# Patient Record
Sex: MALE | Race: ASIAN | NOT HISPANIC OR LATINO | URBAN - METROPOLITAN AREA
[De-identification: names, ages, dates, MRNs, and addresses within clinical notes are randomized per-mention and may not be internally consistent; named-entity substitution may affect disease eponyms.]

---

## 2018-05-28 ENCOUNTER — INPATIENT (INPATIENT)
Facility: HOSPITAL | Age: 63
LOS: 1 days | Discharge: HOME | End: 2018-05-30
Attending: SURGERY | Admitting: SURGERY
Payer: MEDICAID

## 2018-05-28 VITALS
RESPIRATION RATE: 16 BRPM | OXYGEN SATURATION: 97 % | HEART RATE: 111 BPM | DIASTOLIC BLOOD PRESSURE: 111 MMHG | SYSTOLIC BLOOD PRESSURE: 174 MMHG | TEMPERATURE: 98 F

## 2018-05-28 DIAGNOSIS — K62.5 HEMORRHAGE OF ANUS AND RECTUM: ICD-10-CM

## 2018-05-28 LAB
ALBUMIN SERPL ELPH-MCNC: 4.2 G/DL — SIGNIFICANT CHANGE UP (ref 3.3–5)
ALP SERPL-CCNC: 55 U/L — SIGNIFICANT CHANGE UP (ref 40–120)
ALT FLD-CCNC: 29 U/L — SIGNIFICANT CHANGE UP (ref 4–41)
AST SERPL-CCNC: 24 U/L — SIGNIFICANT CHANGE UP (ref 4–40)
BASOPHILS # BLD AUTO: 0.04 K/UL — SIGNIFICANT CHANGE UP (ref 0–0.2)
BASOPHILS # BLD AUTO: 0.05 K/UL — SIGNIFICANT CHANGE UP (ref 0–0.2)
BASOPHILS NFR BLD AUTO: 0.6 % — SIGNIFICANT CHANGE UP (ref 0–2)
BASOPHILS NFR BLD AUTO: 0.6 % — SIGNIFICANT CHANGE UP (ref 0–2)
BILIRUB SERPL-MCNC: 0.5 MG/DL — SIGNIFICANT CHANGE UP (ref 0.2–1.2)
BUN SERPL-MCNC: 18 MG/DL — SIGNIFICANT CHANGE UP (ref 7–23)
CALCIUM SERPL-MCNC: 8.8 MG/DL — SIGNIFICANT CHANGE UP (ref 8.4–10.5)
CHLORIDE SERPL-SCNC: 93 MMOL/L — LOW (ref 98–107)
CO2 SERPL-SCNC: 23 MMOL/L — SIGNIFICANT CHANGE UP (ref 22–31)
CREAT SERPL-MCNC: 0.71 MG/DL — SIGNIFICANT CHANGE UP (ref 0.5–1.3)
EOSINOPHIL # BLD AUTO: 0.22 K/UL — SIGNIFICANT CHANGE UP (ref 0–0.5)
EOSINOPHIL # BLD AUTO: 0.27 K/UL — SIGNIFICANT CHANGE UP (ref 0–0.5)
EOSINOPHIL NFR BLD AUTO: 3.1 % — SIGNIFICANT CHANGE UP (ref 0–6)
EOSINOPHIL NFR BLD AUTO: 3.4 % — SIGNIFICANT CHANGE UP (ref 0–6)
GLUCOSE SERPL-MCNC: 152 MG/DL — HIGH (ref 70–99)
HBA1C BLD-MCNC: 7.7 % — HIGH (ref 4–5.6)
HCT VFR BLD CALC: 39.2 % — SIGNIFICANT CHANGE UP (ref 39–50)
HCT VFR BLD CALC: 39.8 % — SIGNIFICANT CHANGE UP (ref 39–50)
HCT VFR BLD CALC: 42.6 % — SIGNIFICANT CHANGE UP (ref 39–50)
HCT VFR BLD CALC: 43.5 % — SIGNIFICANT CHANGE UP (ref 39–50)
HGB BLD-MCNC: 13.5 G/DL — SIGNIFICANT CHANGE UP (ref 13–17)
HGB BLD-MCNC: 13.9 G/DL — SIGNIFICANT CHANGE UP (ref 13–17)
HGB BLD-MCNC: 14.6 G/DL — SIGNIFICANT CHANGE UP (ref 13–17)
HGB BLD-MCNC: 15.3 G/DL — SIGNIFICANT CHANGE UP (ref 13–17)
IMM GRANULOCYTES # BLD AUTO: 0.01 # — SIGNIFICANT CHANGE UP
IMM GRANULOCYTES # BLD AUTO: 0.03 # — SIGNIFICANT CHANGE UP
IMM GRANULOCYTES NFR BLD AUTO: 0.1 % — SIGNIFICANT CHANGE UP (ref 0–1.5)
IMM GRANULOCYTES NFR BLD AUTO: 0.4 % — SIGNIFICANT CHANGE UP (ref 0–1.5)
LYMPHOCYTES # BLD AUTO: 1.61 K/UL — SIGNIFICANT CHANGE UP (ref 1–3.3)
LYMPHOCYTES # BLD AUTO: 1.89 K/UL — SIGNIFICANT CHANGE UP (ref 1–3.3)
LYMPHOCYTES # BLD AUTO: 20.2 % — SIGNIFICANT CHANGE UP (ref 13–44)
LYMPHOCYTES # BLD AUTO: 26.5 % — SIGNIFICANT CHANGE UP (ref 13–44)
MCHC RBC-ENTMCNC: 29.1 PG — SIGNIFICANT CHANGE UP (ref 27–34)
MCHC RBC-ENTMCNC: 29.6 PG — SIGNIFICANT CHANGE UP (ref 27–34)
MCHC RBC-ENTMCNC: 29.9 PG — SIGNIFICANT CHANGE UP (ref 27–34)
MCHC RBC-ENTMCNC: 30.2 PG — SIGNIFICANT CHANGE UP (ref 27–34)
MCHC RBC-ENTMCNC: 34.3 % — SIGNIFICANT CHANGE UP (ref 32–36)
MCHC RBC-ENTMCNC: 34.4 % — SIGNIFICANT CHANGE UP (ref 32–36)
MCHC RBC-ENTMCNC: 34.9 % — SIGNIFICANT CHANGE UP (ref 32–36)
MCHC RBC-ENTMCNC: 35.2 % — SIGNIFICANT CHANGE UP (ref 32–36)
MCV RBC AUTO: 84.5 FL — SIGNIFICANT CHANGE UP (ref 80–100)
MCV RBC AUTO: 85.1 FL — SIGNIFICANT CHANGE UP (ref 80–100)
MCV RBC AUTO: 86.2 FL — SIGNIFICANT CHANGE UP (ref 80–100)
MCV RBC AUTO: 86.5 FL — SIGNIFICANT CHANGE UP (ref 80–100)
MONOCYTES # BLD AUTO: 0.52 K/UL — SIGNIFICANT CHANGE UP (ref 0–0.9)
MONOCYTES # BLD AUTO: 0.54 K/UL — SIGNIFICANT CHANGE UP (ref 0–0.9)
MONOCYTES NFR BLD AUTO: 6.5 % — SIGNIFICANT CHANGE UP (ref 2–14)
MONOCYTES NFR BLD AUTO: 7.6 % — SIGNIFICANT CHANGE UP (ref 2–14)
NEUTROPHILS # BLD AUTO: 4.44 K/UL — SIGNIFICANT CHANGE UP (ref 1.8–7.4)
NEUTROPHILS # BLD AUTO: 5.51 K/UL — SIGNIFICANT CHANGE UP (ref 1.8–7.4)
NEUTROPHILS NFR BLD AUTO: 62.1 % — SIGNIFICANT CHANGE UP (ref 43–77)
NEUTROPHILS NFR BLD AUTO: 68.9 % — SIGNIFICANT CHANGE UP (ref 43–77)
NRBC # FLD: 0 — SIGNIFICANT CHANGE UP
OB PNL STL: POSITIVE — SIGNIFICANT CHANGE UP
PLATELET # BLD AUTO: 185 K/UL — SIGNIFICANT CHANGE UP (ref 150–400)
PLATELET # BLD AUTO: 196 K/UL — SIGNIFICANT CHANGE UP (ref 150–400)
PLATELET # BLD AUTO: 198 K/UL — SIGNIFICANT CHANGE UP (ref 150–400)
PLATELET # BLD AUTO: 204 K/UL — SIGNIFICANT CHANGE UP (ref 150–400)
PMV BLD: 9.2 FL — SIGNIFICANT CHANGE UP (ref 7–13)
PMV BLD: 9.4 FL — SIGNIFICANT CHANGE UP (ref 7–13)
PMV BLD: 9.5 FL — SIGNIFICANT CHANGE UP (ref 7–13)
PMV BLD: 9.6 FL — SIGNIFICANT CHANGE UP (ref 7–13)
POTASSIUM SERPL-MCNC: 3.9 MMOL/L — SIGNIFICANT CHANGE UP (ref 3.5–5.3)
POTASSIUM SERPL-SCNC: 3.9 MMOL/L — SIGNIFICANT CHANGE UP (ref 3.5–5.3)
PROT SERPL-MCNC: 7.5 G/DL — SIGNIFICANT CHANGE UP (ref 6–8.3)
RBC # BLD: 4.6 M/UL — SIGNIFICANT CHANGE UP (ref 4.2–5.8)
RBC # BLD: 4.64 M/UL — SIGNIFICANT CHANGE UP (ref 4.2–5.8)
RBC # BLD: 4.94 M/UL — SIGNIFICANT CHANGE UP (ref 4.2–5.8)
RBC # BLD: 5.11 M/UL — SIGNIFICANT CHANGE UP (ref 4.2–5.8)
RBC # FLD: 12.2 % — SIGNIFICANT CHANGE UP (ref 10.3–14.5)
RBC # FLD: 12.2 % — SIGNIFICANT CHANGE UP (ref 10.3–14.5)
RBC # FLD: 12.3 % — SIGNIFICANT CHANGE UP (ref 10.3–14.5)
RBC # FLD: 12.3 % — SIGNIFICANT CHANGE UP (ref 10.3–14.5)
RH IG SCN BLD-IMP: POSITIVE — SIGNIFICANT CHANGE UP
SODIUM SERPL-SCNC: 132 MMOL/L — LOW (ref 135–145)
WBC # BLD: 6.71 K/UL — SIGNIFICANT CHANGE UP (ref 3.8–10.5)
WBC # BLD: 7.14 K/UL — SIGNIFICANT CHANGE UP (ref 3.8–10.5)
WBC # BLD: 7.99 K/UL — SIGNIFICANT CHANGE UP (ref 3.8–10.5)
WBC # BLD: 9.1 K/UL — SIGNIFICANT CHANGE UP (ref 3.8–10.5)
WBC # FLD AUTO: 6.71 K/UL — SIGNIFICANT CHANGE UP (ref 3.8–10.5)
WBC # FLD AUTO: 7.14 K/UL — SIGNIFICANT CHANGE UP (ref 3.8–10.5)
WBC # FLD AUTO: 7.99 K/UL — SIGNIFICANT CHANGE UP (ref 3.8–10.5)
WBC # FLD AUTO: 9.1 K/UL — SIGNIFICANT CHANGE UP (ref 3.8–10.5)

## 2018-05-28 PROCEDURE — 99233 SBSQ HOSP IP/OBS HIGH 50: CPT

## 2018-05-28 PROCEDURE — 74174 CTA ABD&PLVS W/CONTRAST: CPT | Mod: 26

## 2018-05-28 RX ORDER — SODIUM CHLORIDE 9 MG/ML
1000 INJECTION, SOLUTION INTRAVENOUS
Qty: 0 | Refills: 0 | Status: DISCONTINUED | OUTPATIENT
Start: 2018-05-28 | End: 2018-05-29

## 2018-05-28 RX ORDER — INSULIN LISPRO 100/ML
VIAL (ML) SUBCUTANEOUS EVERY 6 HOURS
Qty: 0 | Refills: 0 | Status: DISCONTINUED | OUTPATIENT
Start: 2018-05-28 | End: 2018-05-29

## 2018-05-28 RX ORDER — INSULIN GLARGINE 100 [IU]/ML
0 INJECTION, SOLUTION SUBCUTANEOUS
Qty: 0 | Refills: 0 | COMMUNITY

## 2018-05-28 RX ORDER — SODIUM CHLORIDE 9 MG/ML
1000 INJECTION INTRAMUSCULAR; INTRAVENOUS; SUBCUTANEOUS ONCE
Qty: 0 | Refills: 0 | Status: COMPLETED | OUTPATIENT
Start: 2018-05-28 | End: 2018-05-28

## 2018-05-28 RX ORDER — METFORMIN HYDROCHLORIDE 850 MG/1
1 TABLET ORAL
Qty: 0 | Refills: 0 | COMMUNITY

## 2018-05-28 RX ORDER — AMLODIPINE BESYLATE 2.5 MG/1
5 TABLET ORAL ONCE
Qty: 0 | Refills: 0 | Status: COMPLETED | OUTPATIENT
Start: 2018-05-28 | End: 2018-05-28

## 2018-05-28 RX ORDER — ATORVASTATIN CALCIUM 80 MG/1
1 TABLET, FILM COATED ORAL
Qty: 0 | Refills: 0 | COMMUNITY

## 2018-05-28 RX ORDER — LABETALOL HCL 100 MG
10 TABLET ORAL ONCE
Qty: 0 | Refills: 0 | Status: COMPLETED | OUTPATIENT
Start: 2018-05-28 | End: 2018-05-28

## 2018-05-28 RX ORDER — SOD SULF/SODIUM/NAHCO3/KCL/PEG
1000 SOLUTION, RECONSTITUTED, ORAL ORAL EVERY 4 HOURS
Qty: 0 | Refills: 0 | Status: COMPLETED | OUTPATIENT
Start: 2018-05-28 | End: 2018-05-28

## 2018-05-28 RX ADMIN — SODIUM CHLORIDE 125 MILLILITER(S): 9 INJECTION, SOLUTION INTRAVENOUS at 19:25

## 2018-05-28 RX ADMIN — Medication 1000 MILLILITER(S): at 21:52

## 2018-05-28 RX ADMIN — Medication 2: at 17:48

## 2018-05-28 RX ADMIN — Medication 10 MILLIGRAM(S): at 19:06

## 2018-05-28 RX ADMIN — SODIUM CHLORIDE 1000 MILLILITER(S): 9 INJECTION INTRAMUSCULAR; INTRAVENOUS; SUBCUTANEOUS at 05:05

## 2018-05-28 RX ADMIN — Medication 2: at 12:40

## 2018-05-28 RX ADMIN — SODIUM CHLORIDE 125 MILLILITER(S): 9 INJECTION, SOLUTION INTRAVENOUS at 09:18

## 2018-05-28 RX ADMIN — SODIUM CHLORIDE 125 MILLILITER(S): 9 INJECTION, SOLUTION INTRAVENOUS at 06:45

## 2018-05-28 RX ADMIN — Medication 10 MILLIGRAM(S): at 20:34

## 2018-05-28 RX ADMIN — SODIUM CHLORIDE 1000 MILLILITER(S): 9 INJECTION INTRAMUSCULAR; INTRAVENOUS; SUBCUTANEOUS at 03:33

## 2018-05-28 RX ADMIN — AMLODIPINE BESYLATE 5 MILLIGRAM(S): 2.5 TABLET ORAL at 06:32

## 2018-05-28 RX ADMIN — Medication 1000 MILLILITER(S): at 17:49

## 2018-05-28 NOTE — CONSULT NOTE ADULT - ASSESSMENT
62 year old male h/o GI bleed, diverticulosis with colonoscopy and clipping in 2013 who currently presents with two episodes of asymptomatic bright red blood per rectum. Patient states that bloody bowel movements began at midnight [currently 08:30] and have had only two since; stool mixed with blood and clots. Feels similar to previous episode, which was treated with the aforementioned colonoscopy and clipping as well as TXA. Has not had any further bleeding issues or colonoscopies since. Was in his usual good state of health until midnight, eating and drinking as per his norm. Patient currently denies chest pain, shortness of breath, dizziness, weakness or syncope. Patient denies h/o HTN or anti-hypertensive medications.     PLAN:     Neurologic: AAO x4, mentating well; continue to monitor  Respiratory: breathing and saturating well on room air, will maintain SPO2 >92%  Cardiovascular: H/H stable x2 at 15/45, transfuse prn  Gastrointestinal/Nutrition:   Renal/Genitourinary:   Hematologic:   Infectious Disease:   Tubes/Lines/Drains:   Endocrine:   Disposition:     --------------------------------------------------------------------------------------    Critical Care Diagnoses: 62 year old male h/o GI bleed, diverticulosis with colonoscopy and clipping in 2013 who currently presents with two episodes of asymptomatic bright red blood per rectum. Patient states that bloody bowel movements began at midnight [currently 08:30] and have had only two since; stool mixed with blood and clots. Feels similar to previous episode, which was treated with the aforementioned colonoscopy and clipping as well as TXA. Has not had any further bleeding issues or colonoscopies since. Was in his usual good state of health until midnight, eating and drinking as per his norm. Patient currently denies chest pain, shortness of breath, dizziness, weakness or syncope. Patient denies h/o HTN or anti-hypertensive medications.     PLAN:     Neurologic: AAO x4, mentating well; continue to monitor  Respiratory: Adequate oxygenation; saturating well on room air, will maintain SPO2 >92%  Cardiovascular: Adequately perfused. Hemodynamically stable. C/w IVF w/ LR @ 125 cc/hr.  Gastrointestinal/Nutrition: Clear liquid diet. GI consulted - f/u recs. CTPA to localize source of bleeding.  Renal/Genitourinary: BUN/Cr stable. Monitor UOP. Ins and Outs. Hyponatremia 2/2 to GI losses - trend BMP. C/w IVF LR @125cc/hr.  Hematologic: H/H stable x 2. Trend CBC, transfuse PRN for Hgb goal >7.0. Coags wnl. Mechanical VTE ppx in setting of active bleeding.   Infectious Disease: Afebrile. No leukocytosis. Monitor off abx for now.  Tubes/Lines/Drains: PIV  Endocrine: Blood glucose wnl. Monitor FS.  Disposition: SICU    --------------------------------------------------------------------------------------    Critical Care Diagnoses: Gastrointestinal bleeding, Hyponatremia

## 2018-05-28 NOTE — CONSULT NOTE ADULT - ATTENDING COMMENTS
I have seen and evaluated the patient with the GI Fellow and GI Team.  I agree with the findings, formulation and plan of care as documented in the  fellow's note, except as noted.
I have personally interviewed and examined this patient, reviewed pertinent labs and imaging, and discussed the case with colleagues, residents, physician assistants, and nurses on SICU rounds.     35    minutes in total were spent in providing direct critical care for the diagnoses, assessment and plan outlined below.  These diagnoses are unrelated to the surgical procedure.  Additionally, time spent in the performance of separately billable procedures was not counted toward the critical care time.  There is no overlap.    The active critical care issues are:  I10 Hypertension, unspecified type   K57.91 Gastrointestinal hemorrhage associated with intestinal diverticulosis     Plan  neuro: no active issue  cards: hypertensive urgency (systolic > 200) resolved with po amlodipine x1, continue to monitor, goal systolic < 180  pulm: no active issue  GI: NPO monitor for bleeding. CTA with active extravesation, fu GI, fu primary team  : monitor uop, Cr  heme/ID: q6hr cbc until stable x 3, no abx  endo: no active issue  proph: scds only for now

## 2018-05-28 NOTE — ED PROVIDER NOTE - OBJECTIVE STATEMENT
62yM visiting family from the [facility] hx diverticulosis (hx large bleed 5yrs ago requiring 1unit prbc) p/w brbpr x 1 tonight. Pt denies abd pain, n/v, sob, palpitations, or weakness. Denies hx hemorrhoids. States this episode feels similar to prior diverticulosis episode. He informed his GI doctor (in the [facility]) who told him to go to hospital to get TXA. 62yM hx dm, hld, diverticulosis visiting family from the [facility] hx diverticulosis (hx large bleed 5yrs ago requiring 1unit prbc) p/w brbpr x 1 tonight. Pt denies abd pain, n/v, sob, palpitations, or weakness. Denies hx hemorrhoids. States this episode feels similar to prior diverticulosis episode. He informed his GI doctor (in the [facility]) who told him to go to hospital to get TXA.

## 2018-05-28 NOTE — ED PROVIDER NOTE - MEDICAL DECISION MAKING DETAILS
brbpr in pt with hx diverticulosis->likely recurrent diverticular bleed->cbc trend, t&s, gi brbpr in pt with hx diverticulosis->likely recurrent diverticular bleed->cbc trend, t&s

## 2018-05-28 NOTE — CONSULT NOTE ADULT - SUBJECTIVE AND OBJECTIVE BOX
Chief Complaint:  Patient is a 62y old  Male who presents with a chief complaint of bright red blood per rectum (28 May 2018 06:26)      HPI: 62 year old male with HTN, T2DM, history of diverticula with hemorrhage in the past, who presents with multiple episodes of bright red blood per rectum. Patient started to have bloody bowel movements at midnight and has had 3-4 since then, with blood mixed with some loose stool, passing clots and bright red blood. States this is similar to a prior episode of BRBPR in 2013. Patient was admitted with the same symptoms in the [facility], states he underwent colonoscopy and was found to have many diverticulae, and had clips placed for a right-sided diverticular bleed. Then he was given TXA and blood and required no further intervention. He has not had a colonoscopy since, he has not had any other issues since. Denies abdominal pain, nausea, emesis, constipation, chest pain, palpitations, shortness of breath, headache, lightheadedness, visual changes, weakness, diaphoresis. Of note, patient is visiting from the [facility].      Allergies:  No Known Allergies    Home Medications:  Lantus:  (28 May 2018 07:10)  Lipitor 20 mg oral tablet: 1 tab(s) orally once a day (28 May 2018 07:10)  metFORMIN 1000 mg oral tablet: 1 tab(s) orally 2 times a day (28 May 2018 07:10)      Hospital Medications:  insulin lispro (HumaLOG) corrective regimen sliding scale   SubCutaneous every 6 hours  lactated ringers. 1000 milliLiter(s) IV Continuous <Continuous>      PMHX/PSHX:  Diabetes mellitus  HTN (hypertension)  Diverticulosis  No significant past surgical history      Family history:  Family history of breast cancer in mother (Mother) at age 101.  There is no family history of peptic ulcer disease, gastric cancer, colon polyps, colon cancer, celiac disease, biliary, hepatic, pancreatic disease.  None of the female relatives have uterine or ovarian cancer.      Social History: Former smoker (60 pack year history, quit 1995), denies EtOH use, lives in the [facility], works as an , visiting family    ROS:     General:  No wt loss, fevers, chills, night sweats, fatigue,   Eyes:  Good vision, no reported pain  ENT:  No sore throat, pain, runny nose, dysphagia  CV:  No pain, palpitations, hypo/hypertension  Resp:  No dyspnea, cough, tachypnea, wheezing  GI:  See HPI  :  No pain, bleeding, incontinence, nocturia  Muscle:  No pain, weakness  Neuro:  No weakness, tingling, memory problems  Psych:  No fatigue, insomnia, mood problems, depression  Endocrine:  No polyuria, polydipsia, cold/heat intolerance  Heme:  No petechiae, ecchymosis, easy bruisability  Skin:  No rash, edema      PHYSICAL EXAM:     GENERAL:  Appears stated age, well-groomed, well-nourished, no distress  HEENT:  NC/AT,  conjunctivae clear and pink,  no JVD  CHEST:  Full & symmetric excursion, no increased effort, breath sounds clear  HEART:  Regular rhythm, S1, S2, no murmur/rub/S3/S4, no abdominal bruit, no edema  ABDOMEN:  Soft, non-tender, non-distended, normoactive bowel sounds,  no masses ,  EXTREMITIES:  no cyanosis,clubbing or edema  SKIN:  No rash/erythema/ecchymoses/petechiae/wounds/abscess/warm/dry  NEURO:  Alert, oriented    Vital Signs:  Vital Signs Last 24 Hrs  T(C): 36.8 (28 May 2018 08:20), Max: 37.2 (28 May 2018 02:42)  T(F): 98.3 (28 May 2018 08:20), Max: 99 (28 May 2018 02:42)  HR: 93 (28 May 2018 13:00) (90 - 111)  BP: 161/129 (28 May 2018 13:00) (149/99 - 213/137)  BP(mean): 136 (28 May 2018 13:00) (107 - 136)  RR: 16 (28 May 2018 13:00) (15 - 22)  SpO2: 96% (28 May 2018 13:00) (94% - 100%)  Daily     Daily     LABS:                        13.5   6.71  )-----------( 185      ( 28 May 2018 12:35 )             39.2     05-28    132<L>  |  93<L>  |  18  ----------------------------<  152<H>  3.9   |  23  |  0.71    Ca    8.8      28 May 2018 03:00    TPro  7.5  /  Alb  4.2  /  TBili  0.5  /  DBili  x   /  AST  24  /  ALT  29  /  AlkPhos  55  05-28    LIVER FUNCTIONS - ( 28 May 2018 03:00 )  Alb: 4.2 g/dL / Pro: 7.5 g/dL / ALK PHOS: 55 u/L / ALT: 29 u/L / AST: 24 u/L / GGT: x                   Imaging:    < from: CT Angio Abdomen and Pelvis w/ IV Cont (05.28.18 @ 09:52) >  EXAM:  CT ANGIO ABD PELV (W)AW IC        PROCEDURE DATE:  May 28 2018         INTERPRETATION:  CLINICAL INFORMATION: Prior right-sided diverticular   bleed, now with prior blood per rectum. Evaluate for GI bleed.    COMPARISON: None available.    PROCEDURE:   CT of the Abdomen and Pelvis was performed with and without intravenous   contrast.  Precontrast, Arterial and Delayed phases were performed.  Intravenous contrast: 90 ml Omnipaque 350. 10 ml discarded.  Oral contrast: None.  Sagittal and coronal reformats were performed.    FINDINGS:    LOWER CHEST: 4 mm nodule in the right lower lobe.    LIVER: 4 mm low-attenuation lesion in segment 6 is probably a cyst.  BILE DUCTS: Normal caliber.  GALLBLADDER: Within normal limits.  SPLEEN: Withinnormal limits.  PANCREAS: Within normal limits.  ADRENALS: Within normal limits.  KIDNEYS/URETERS: Within normal limits.    BLADDER: Within normal limits.  REPRODUCTIVE ORGANS: The prostate is within normal limits.    BOWEL: Arterial contrast blush is noted within the descending colon   (6:241) and in the left lateral wall of the rectum with diffusion on   delayed phases. Colonic diverticulosis without acute diverticulitis. No   bowel obstruction. Appendix is normal.  PERITONEUM: No ascites.  VESSELS:  Atherosclerotic changes.  RETROPERITONEUM: No lymphadenopathy.    ABDOMINAL WALL: Small fat-containing umbilical hernia.  BONES: Degenerative changes of the spine.    IMPRESSION:     Active GI bleed in the rectum and the descending colon. No evidence of   acute diverticulitis.    Findings discussed with DR. SCHMIDT on 5/28/2018 12:35 PM with read back.               ANDREA WILLS M.D., RADIOLOGY RESIDENT  This document has been electronically signed.  JESÚS RUSSELL M.D. ATTENDING RADIOLOGIST  This document has been electronically signed. May 28 2018 12:35PM        < end of copied text >

## 2018-05-28 NOTE — ED PROVIDER NOTE - ATTENDING CONTRIBUTION TO CARE
DR. SAUCEDA, ATTENDING MD-  I performed a face to face bedside interview with patient regarding history of present illness, review of symptoms and past medical history. I completed an independent physical exam.  I have discussed patient's plan of care with the resident.   Documentation as above in the note.    63 y/o male h/o htn and diverticulosis with c/o brbpr x1 this evening.  States he has had h/o lower gib in past.  Last colonoscopy 4 years ago.  Denies f/c, ha, dizziness, syncope, neck stiffness, cp, sob, cough, abd pain, n/v/d, dysuria, rash.  Afebrile, vs wnl, nad, no conjunctival pallor, dry mm, ctabil, s1s2 rrr no m/r/g, abd soft non ttp no r/g, no cva tenderness b/l, no leg swelling b/l, no rash.  One episode of brbpr, likely lower gib, no signs or sx of anemia.  Will obtain cbc, bmp, t&s, coags, stool guaiac, give ivf bolus, reassess.

## 2018-05-28 NOTE — ED PROVIDER NOTE - PROGRESS NOTE DETAILS
guaiac obtained. no hemorrhoids, nontender rectum, no gross blood, only mucus  chaperone: JAMES Colon MD CHO:  Engaged in shared decision making, pt agrees to stay for rpt CBC q4 hrs x2.  Will obs for rpt h/h. MD SAUCEDA:  Pt has had bloody bm x4 in last hour.  Denies lightheadedness, syncope.  Will need admission for further care. MD CHO:  Engaged in shared decision making, pt agrees to stay for rpt CBC q4 hrs x2.  Will likely obs for rpt h/h. Surgery consulted. They will not admit pt but will follow

## 2018-05-28 NOTE — ED ADULT NURSE NOTE - CHIEF COMPLAINT QUOTE
Pt arrives to ED c/o abd pain and blood in his stool since this evening.  Pt reports blood is bright red.  Pt hypertensive in triage and reports he does take HTN medication.  Pt denies N/V.  Pt has hx of diverticulosis.  Pt denies SOB or weakness at this.  Pt reports hx of one time transfusion.  Pt daughter is [employer] employee and here with pt.

## 2018-05-28 NOTE — CONSULT NOTE ADULT - ASSESSMENT
Impression:  63yo M with history of HTN, T2DM, previous diverticular bleeds, who presents to the ED while on vacation in [place] with painless hematochezia.    Problem List:  1) Painless hematochezia - most likely 2/2 diverticular hemorrhage, also consider 2/2 bleeding mass, angioectasia, less likely rapid transit upper GI bleed given hemodynamic stability.  Less likely infectious (patient reports eating sashimi last night, could have dysentery) given amount of blood loss. Pt not anemic but notice downtrend of Hgb from 15 -->13  2) T2DM  3) HTN    Plan:  - will plan for colonoscopy tomorrow for diagnostic and therapeutic measures  - Clear liquid diet today, NPO after midnight  - will order bowel prep to start this evening   - trend CBC q 12 hours, transfuse if Hgb <7  - can give IV PPI BID in case source is upper (low likelihood however)  - check GI PCR for infectious source (ie EHEC, entamoeba)

## 2018-05-28 NOTE — H&P ADULT - HISTORY OF PRESENT ILLNESS
HPI: 62 year old male presenting with multiple episodes of bright red blood per rectum.    PMHx: HTN, DM II, diverticulosis, GI bleed (R sided diverticular bleed, 2013)    PSHx: No significant past surgical history    Medications (home): lantus, metformin, lipitor  Allergies: No Known Allergies  (Intolerances: none)  Social Hx: Former smoker (60 pack year history, quit ), denies EtOH use, lives in the [facility], works as an , visiting family  Family Hx: Mother  age 101, breast cancer    Physical exam significant for nontender, nondistended abdomen    Imaging and labs remarkable for hematocrit of 15.3 HPI: 62 year old male presenting with multiple episodes of bright red blood per rectum. Patient started to have bloody bowel movements at midnight (7 hours ago) and has had 3-4 since then, with blood mixed with some loose stool, passing clots and bright red blood. States this is similar to a prior episode of BRBPR in 2013. Patient was admitted with the same symptoms in the [facility], states he underwent colonoscopy and was found to have many diverticulae, and had clips placed for a right-sided diverticular bleed. Then he was given TXA and blood and required no further intervention. He has not had a colonoscopy since, he has not had any other issues since. Denies abdominal pain, nausea, emesis, constipation, chest pain, palpitations, shortness of breath, headache, lightheadedness, visual changes, weakness, diaphoresis. Of note, patient is visiting from the [facility].    PMHx: HTN, DM II, diverticulosis, GI bleed (R sided diverticular bleed, 2013)    PSHx: No significant past surgical history    Medications (home): lantus, metformin, lipitor (patient is unsure of doses, denies antihypertensive medications)  Allergies: No Known Allergies  (Intolerances: none)  Social Hx: Former smoker (60 pack year history, quit ), denies EtOH use, lives in the [facility], works as an , visiting family  Family Hx: Mother  age 101, breast cancer    Physical exam significant for nontender, nondistended abdomen    Imaging and labs remarkable for hematocrit of 15.3

## 2018-05-28 NOTE — H&P ADULT - ASSESSMENT
64 year old male with bright red blood per rectum, likely associated with repeat diverticular bleed; requiring no transfusion, mildly tachycardic () and hypertensive.    - admit to SICU for serial Hg/Hct and blood pressure control  - no chemical VTE ppx in setting of active bleed  - request CT angio to attempt to localize bleed  - NPO/IVF at this time    discussed with Dr. Harris and Dr. Brito (PGY-5)  --DEIDRA Rubio, [id number]

## 2018-05-28 NOTE — CONSULT NOTE ADULT - SUBJECTIVE AND OBJECTIVE BOX
SICU Consultation Note  =====================================================  HISTORY  HPI: The patient is a 62 year old male h/o GI bleed, diverticulosis with subsequent colonoscopy and clipping in 2013 who currently presents with two episodes of asymptomatic bright red blood per rectum. Stable H/H 15/45 x2. Patient states that bloody bowel movements began at midnight [currently 08:30] and have had only two since; stool mixed with blood and clots. Feels similar to previous episode, which was treated with the aforementioned colonoscopy and clipping as well as TXA. Has not had any further bleeding issues or colonoscopies since. Was in his usual good state of health until midnight, eating and drinking as per his norm. Patient currently denies chest pain, shortness of breath, dizziness, weakness or syncope. Patient denies h/o HTN or anti-hypertensive medications.     Patient is on vacation from the [facility], and has a scheduled flight home this Wednesday, which he would like to make.      Medical and Surgical History:   - no surgical history  - HTN  - DM II  - Diverticulosis  - GI bleed (R sided diverticular bleed, 2013)     Medications:  - Lantus (24-25 units)  - Metformin  - Lipitor     Allergies: NKDA     Social Hx: Former smoker (60 pack year history, quit 1995), denies EtOH use, lives in the [facility], works as an , visiting family        ADVANCE DIRECTIVES:  Full Code       SUBJECTIVE/ROS:  [x] A ten-point review of systems was otherwise negative except as noted.  [ ] Due to altered mental status/intubation, subjective information were not able to be obtained from the patient. History was obtained, to the extent possible, from review of the chart and collateral sources of information.      VITAL SIGNS, INS/OUTS (last 24 hours):  --------------------------------------------------------------------------------------  ICU Vital Signs Last 24 Hrs  T(C): 36.8 (28 May 2018 08:20), Max: 37.2 (28 May 2018 02:42)  T(F): 98.3 (28 May 2018 08:20), Max: 99 (28 May 2018 02:42)  HR: 100 (28 May 2018 08:30) (98 - 111)  BP: 166/96 (28 May 2018 08:30) (166/96 - 213/137)  BP(mean): 114 (28 May 2018 08:30) (114 - 122)  ABP: --  ABP(mean): --  RR: 19 (28 May 2018 08:30) (16 - 20)  SpO2: 94% (28 May 2018 08:30) (94% - 100%)  --------------------------------------------------------------------------------------    EXAM  NEUROLOGY  Exam: AAO x4, no focal deficits    HEENT  Exam: Normocephalic, atraumatic    RESPIRATORY  Exam: non-labored breathing on room air    CARDIOVASCULAR  Exam: Regular rhythm, rate in the 90s    GI/NUTRITION  Exam: softly distended, non-tender    VASCULAR  Exam: Extremities warm, pink, well-perfused.      MUSCULOSKELETAL  Exam: All extremities moving spontaneously without limitations.       SKIN:  Exam: Good skin turgor, no skin breakdown.       HEMATOLOGIC  [x] DVT Prophylaxis:   Transfusions:	[] PRBC	[] Platelets		[] FFP	[] Cryoprecipitate      Tubes/Lines/Drains   [x] Peripheral IV  [] Central Venous Line     	[] R	[] L	[] IJ	[] Fem	[] SC	Date Placed:   [] Arterial Line		[] R	[] L	[] Fem	[] Rad	[] Ax	Date Placed:   [] PICC:         	[] Midline		[] Mediport  [] Urinary Catheter		Date Placed:     LABS  --------------------------------------------------------------------------------------     I&O's Detail    28 May 2018 07:01  -  28 May 2018 08:49  --------------------------------------------------------  IN:    lactated ringers.: 125 mL  Total IN: 125 mL    OUT:  Total OUT: 0 mL    Total NET: 125 mL                           14.6   7.14  )-----------( 204      ( 28 May 2018 06:30 )             42.6       05-28    132<L>  |  93<L>  |  18  ----------------------------<  152<H>  3.9   |  23  |  0.71    Ca    8.8      28 May 2018 03:00    TPro  7.5  /  Alb  4.2  /  TBili  0.5  /  DBili  x   /  AST  24  /  ALT  29  /  AlkPhos  55  05-28

## 2018-05-28 NOTE — ED ADULT TRIAGE NOTE - CHIEF COMPLAINT QUOTE
Pt arrives to ED c/o abd pain and blood in his stool since this evening.  Pt reports blood is bright red.  Pt hypertensive in triage and reports he does take HTN medication.  Pt denies N/V.  Pt has hx of diverticulosis.  Pt denies SOB or weakness at this.  Pt reports hx of one time transfusion.  Pt daughter is [employer] employee and here with pt.

## 2018-05-28 NOTE — H&P ADULT - NSHPLABSRESULTS_GEN_ALL_CORE
Complete Blood Count + Automated Diff (05.28.18 @ 03:00)    WBC Count: 7.99 K/uL    Hemoglobin: 15.3 g/dL    Hematocrit: 43.5 %    Platelet Count - Automated: 198 K/uL       Complete Blood Count + Automated Diff (05.28.18 @ 06:30)    WBC Count: 7.14 K/uL    Hemoglobin: 14.6 g/dL    Hematocrit: 42.6 %    Platelet Count - Automated: 204 K/uL                      14.6   7.14  )-----------( 204      ( 28 May 2018 06:30 )             42.6   05-28    132<L>  |  93<L>  |  18  ----------------------------<  152<H>  3.9   |  23  |  0.71    Ca    8.8      28 May 2018 03:00    TPro  7.5  /  Alb  4.2  /  TBili  0.5  /  DBili  x   /  AST  24  /  ALT  29  /  AlkPhos  55  05-28

## 2018-05-28 NOTE — ED ADULT NURSE NOTE - OBJECTIVE STATEMENT
62Y M aaOx3 c/o abdominal pain. pt has a hx of diverticuloses and is experiencing the same pain/symptoms. pt also states that has last BM a few hours ago had bright red blood present in his stool. pt denies light headiness/dizziness/ faintness. pt appears comfortable and also admits to having 2 beers tonight. pt states his abdomen is distended and feels bloated. pt abdomen is firm and non tender during assessment. bowel sounds present in all4 quadrants. respirations are even and unlabored. no ectopy noted when listening to Hr.

## 2018-05-28 NOTE — ED ADULT NURSE REASSESSMENT NOTE - NS ED NURSE REASSESS COMMENT FT1
PT previously accepted to CDU for observation; while in CDU had 4 bowel movements with BRB in 1 hour. PT brought back to main ED room 24; admitted to medicine and awaiting bed assignment. VS as noted. PT denies any pain, SOB, weakness, fatigue. Will continue to monitor closely.

## 2018-05-28 NOTE — ED ADULT NURSE NOTE - ED STAT RN HANDOFF DETAILS
Patient is A&Ox4, aware of plan of care, in NAD, and has room available.  Report given to JAMES Crane.  Patient awaiting transportation.  Will continue to monitor patient closely. ANNA Fitzgerald R.N. Patient is A&Ox4, aware of plan of care, and has room available.  Report given to JAMES Hoyos.  Patient awaiting transportation.  Will continue to monitor patient closely. ANNA Fitzgerald R.N.

## 2018-05-29 LAB
APTT BLD: 33.9 SEC — SIGNIFICANT CHANGE UP (ref 27.5–37.4)
BUN SERPL-MCNC: 11 MG/DL — SIGNIFICANT CHANGE UP (ref 7–23)
CA-I BLD-SCNC: 1.06 MMOL/L — SIGNIFICANT CHANGE UP (ref 1.03–1.23)
CALCIUM SERPL-MCNC: 7.7 MG/DL — LOW (ref 8.4–10.5)
CHLORIDE SERPL-SCNC: 102 MMOL/L — SIGNIFICANT CHANGE UP (ref 98–107)
CO2 SERPL-SCNC: 25 MMOL/L — SIGNIFICANT CHANGE UP (ref 22–31)
CREAT SERPL-MCNC: 0.82 MG/DL — SIGNIFICANT CHANGE UP (ref 0.5–1.3)
GLUCOSE SERPL-MCNC: 134 MG/DL — HIGH (ref 70–99)
HCT VFR BLD CALC: 31 % — LOW (ref 39–50)
HCT VFR BLD CALC: 31.8 % — LOW (ref 39–50)
HCT VFR BLD CALC: 32.4 % — LOW (ref 39–50)
HCT VFR BLD CALC: 32.9 % — LOW (ref 39–50)
HGB BLD-MCNC: 10.9 G/DL — LOW (ref 13–17)
HGB BLD-MCNC: 11.1 G/DL — LOW (ref 13–17)
HGB BLD-MCNC: 11.2 G/DL — LOW (ref 13–17)
HGB BLD-MCNC: 11.3 G/DL — LOW (ref 13–17)
INR BLD: 1.09 — SIGNIFICANT CHANGE UP (ref 0.88–1.17)
MAGNESIUM SERPL-MCNC: 1.8 MG/DL — SIGNIFICANT CHANGE UP (ref 1.6–2.6)
MCHC RBC-ENTMCNC: 29.2 PG — SIGNIFICANT CHANGE UP (ref 27–34)
MCHC RBC-ENTMCNC: 29.9 PG — SIGNIFICANT CHANGE UP (ref 27–34)
MCHC RBC-ENTMCNC: 29.9 PG — SIGNIFICANT CHANGE UP (ref 27–34)
MCHC RBC-ENTMCNC: 30 PG — SIGNIFICANT CHANGE UP (ref 27–34)
MCHC RBC-ENTMCNC: 34.3 % — SIGNIFICANT CHANGE UP (ref 32–36)
MCHC RBC-ENTMCNC: 34.3 % — SIGNIFICANT CHANGE UP (ref 32–36)
MCHC RBC-ENTMCNC: 35.2 % — SIGNIFICANT CHANGE UP (ref 32–36)
MCHC RBC-ENTMCNC: 35.2 % — SIGNIFICANT CHANGE UP (ref 32–36)
MCV RBC AUTO: 83.1 FL — SIGNIFICANT CHANGE UP (ref 80–100)
MCV RBC AUTO: 84.8 FL — SIGNIFICANT CHANGE UP (ref 80–100)
MCV RBC AUTO: 87 FL — SIGNIFICANT CHANGE UP (ref 80–100)
MCV RBC AUTO: 87.6 FL — SIGNIFICANT CHANGE UP (ref 80–100)
NRBC # FLD: 0 — SIGNIFICANT CHANGE UP
PHOSPHATE SERPL-MCNC: 3.2 MG/DL — SIGNIFICANT CHANGE UP (ref 2.5–4.5)
PLATELET # BLD AUTO: 158 K/UL — SIGNIFICANT CHANGE UP (ref 150–400)
PLATELET # BLD AUTO: 162 K/UL — SIGNIFICANT CHANGE UP (ref 150–400)
PLATELET # BLD AUTO: 167 K/UL — SIGNIFICANT CHANGE UP (ref 150–400)
PLATELET # BLD AUTO: 182 K/UL — SIGNIFICANT CHANGE UP (ref 150–400)
PMV BLD: 9.1 FL — SIGNIFICANT CHANGE UP (ref 7–13)
PMV BLD: 9.1 FL — SIGNIFICANT CHANGE UP (ref 7–13)
PMV BLD: 9.4 FL — SIGNIFICANT CHANGE UP (ref 7–13)
PMV BLD: 9.6 FL — SIGNIFICANT CHANGE UP (ref 7–13)
POTASSIUM SERPL-MCNC: 3.4 MMOL/L — LOW (ref 3.5–5.3)
POTASSIUM SERPL-SCNC: 3.4 MMOL/L — LOW (ref 3.5–5.3)
PROTHROM AB SERPL-ACNC: 12.1 SEC — SIGNIFICANT CHANGE UP (ref 9.8–13.1)
RBC # BLD: 3.7 M/UL — LOW (ref 4.2–5.8)
RBC # BLD: 3.73 M/UL — LOW (ref 4.2–5.8)
RBC # BLD: 3.75 M/UL — LOW (ref 4.2–5.8)
RBC # BLD: 3.78 M/UL — LOW (ref 4.2–5.8)
RBC # FLD: 12.4 % — SIGNIFICANT CHANGE UP (ref 10.3–14.5)
RBC # FLD: 12.6 % — SIGNIFICANT CHANGE UP (ref 10.3–14.5)
RBC # FLD: 12.6 % — SIGNIFICANT CHANGE UP (ref 10.3–14.5)
RBC # FLD: 12.7 % — SIGNIFICANT CHANGE UP (ref 10.3–14.5)
SODIUM SERPL-SCNC: 138 MMOL/L — SIGNIFICANT CHANGE UP (ref 135–145)
WBC # BLD: 6.18 K/UL — SIGNIFICANT CHANGE UP (ref 3.8–10.5)
WBC # BLD: 6.61 K/UL — SIGNIFICANT CHANGE UP (ref 3.8–10.5)
WBC # BLD: 6.89 K/UL — SIGNIFICANT CHANGE UP (ref 3.8–10.5)
WBC # BLD: 7.65 K/UL — SIGNIFICANT CHANGE UP (ref 3.8–10.5)
WBC # FLD AUTO: 6.18 K/UL — SIGNIFICANT CHANGE UP (ref 3.8–10.5)
WBC # FLD AUTO: 6.61 K/UL — SIGNIFICANT CHANGE UP (ref 3.8–10.5)
WBC # FLD AUTO: 6.89 K/UL — SIGNIFICANT CHANGE UP (ref 3.8–10.5)
WBC # FLD AUTO: 7.65 K/UL — SIGNIFICANT CHANGE UP (ref 3.8–10.5)

## 2018-05-29 PROCEDURE — 43239 EGD BIOPSY SINGLE/MULTIPLE: CPT | Mod: GC

## 2018-05-29 PROCEDURE — 99233 SBSQ HOSP IP/OBS HIGH 50: CPT

## 2018-05-29 RX ORDER — SODIUM CHLORIDE 9 MG/ML
1000 INJECTION, SOLUTION INTRAVENOUS
Qty: 0 | Refills: 0 | Status: DISCONTINUED | OUTPATIENT
Start: 2018-05-29 | End: 2018-05-30

## 2018-05-29 RX ORDER — HYDRALAZINE HCL 50 MG
10 TABLET ORAL EVERY 6 HOURS
Qty: 0 | Refills: 0 | Status: DISCONTINUED | OUTPATIENT
Start: 2018-05-29 | End: 2018-05-30

## 2018-05-29 RX ORDER — DEXTROSE MONOHYDRATE, SODIUM CHLORIDE, AND POTASSIUM CHLORIDE 50; .745; 4.5 G/1000ML; G/1000ML; G/1000ML
1000 INJECTION, SOLUTION INTRAVENOUS
Qty: 0 | Refills: 0 | Status: DISCONTINUED | OUTPATIENT
Start: 2018-05-29 | End: 2018-05-30

## 2018-05-29 RX ORDER — CHLORHEXIDINE GLUCONATE 213 G/1000ML
1 SOLUTION TOPICAL
Qty: 0 | Refills: 0 | Status: DISCONTINUED | OUTPATIENT
Start: 2018-05-29 | End: 2018-05-30

## 2018-05-29 RX ORDER — DEXTROSE 50 % IN WATER 50 %
15 SYRINGE (ML) INTRAVENOUS ONCE
Qty: 0 | Refills: 0 | Status: DISCONTINUED | OUTPATIENT
Start: 2018-05-29 | End: 2018-05-30

## 2018-05-29 RX ORDER — MAGNESIUM SULFATE 500 MG/ML
1 VIAL (ML) INJECTION ONCE
Qty: 0 | Refills: 0 | Status: COMPLETED | OUTPATIENT
Start: 2018-05-29 | End: 2018-05-29

## 2018-05-29 RX ORDER — DEXTROSE 50 % IN WATER 50 %
12.5 SYRINGE (ML) INTRAVENOUS ONCE
Qty: 0 | Refills: 0 | Status: DISCONTINUED | OUTPATIENT
Start: 2018-05-29 | End: 2018-05-30

## 2018-05-29 RX ORDER — PSYLLIUM SEED (WITH DEXTROSE)
1 POWDER (GRAM) ORAL DAILY
Qty: 0 | Refills: 0 | Status: DISCONTINUED | OUTPATIENT
Start: 2018-05-29 | End: 2018-05-30

## 2018-05-29 RX ORDER — DEXTROSE 50 % IN WATER 50 %
25 SYRINGE (ML) INTRAVENOUS ONCE
Qty: 0 | Refills: 0 | Status: DISCONTINUED | OUTPATIENT
Start: 2018-05-29 | End: 2018-05-30

## 2018-05-29 RX ORDER — INSULIN LISPRO 100/ML
VIAL (ML) SUBCUTANEOUS
Qty: 0 | Refills: 0 | Status: DISCONTINUED | OUTPATIENT
Start: 2018-05-29 | End: 2018-05-30

## 2018-05-29 RX ORDER — HYDRALAZINE HCL 50 MG
10 TABLET ORAL ONCE
Qty: 0 | Refills: 0 | Status: COMPLETED | OUTPATIENT
Start: 2018-05-29 | End: 2018-05-29

## 2018-05-29 RX ORDER — POTASSIUM CHLORIDE 20 MEQ
10 PACKET (EA) ORAL
Qty: 0 | Refills: 0 | Status: COMPLETED | OUTPATIENT
Start: 2018-05-29 | End: 2018-05-29

## 2018-05-29 RX ORDER — LANOLIN ALCOHOL/MO/W.PET/CERES
3 CREAM (GRAM) TOPICAL ONCE
Qty: 0 | Refills: 0 | Status: COMPLETED | OUTPATIENT
Start: 2018-05-29 | End: 2018-05-29

## 2018-05-29 RX ORDER — SODIUM CHLORIDE 9 MG/ML
1000 INJECTION, SOLUTION INTRAVENOUS
Qty: 0 | Refills: 0 | Status: DISCONTINUED | OUTPATIENT
Start: 2018-05-29 | End: 2018-05-29

## 2018-05-29 RX ORDER — LABETALOL HCL 100 MG
100 TABLET ORAL THREE TIMES A DAY
Qty: 0 | Refills: 0 | Status: DISCONTINUED | OUTPATIENT
Start: 2018-05-29 | End: 2018-05-30

## 2018-05-29 RX ORDER — GLUCAGON INJECTION, SOLUTION 0.5 MG/.1ML
1 INJECTION, SOLUTION SUBCUTANEOUS ONCE
Qty: 0 | Refills: 0 | Status: DISCONTINUED | OUTPATIENT
Start: 2018-05-29 | End: 2018-05-30

## 2018-05-29 RX ADMIN — Medication 2: at 13:24

## 2018-05-29 RX ADMIN — Medication 100 MILLIGRAM(S): at 18:11

## 2018-05-29 RX ADMIN — Medication 10 MILLIGRAM(S): at 22:57

## 2018-05-29 RX ADMIN — DEXTROSE MONOHYDRATE, SODIUM CHLORIDE, AND POTASSIUM CHLORIDE 75 MILLILITER(S): 50; .745; 4.5 INJECTION, SOLUTION INTRAVENOUS at 18:10

## 2018-05-29 RX ADMIN — Medication 100 MILLIEQUIVALENT(S): at 08:20

## 2018-05-29 RX ADMIN — Medication 2: at 05:57

## 2018-05-29 RX ADMIN — Medication 2: at 18:11

## 2018-05-29 RX ADMIN — Medication 3 MILLIGRAM(S): at 22:57

## 2018-05-29 RX ADMIN — Medication 100 GRAM(S): at 05:22

## 2018-05-29 RX ADMIN — Medication 2: at 22:37

## 2018-05-29 RX ADMIN — CHLORHEXIDINE GLUCONATE 1 APPLICATION(S): 213 SOLUTION TOPICAL at 14:12

## 2018-05-29 RX ADMIN — DEXTROSE MONOHYDRATE, SODIUM CHLORIDE, AND POTASSIUM CHLORIDE 75 MILLILITER(S): 50; .745; 4.5 INJECTION, SOLUTION INTRAVENOUS at 21:25

## 2018-05-29 RX ADMIN — Medication 10 MILLIGRAM(S): at 21:26

## 2018-05-29 RX ADMIN — Medication 100 MILLIEQUIVALENT(S): at 05:16

## 2018-05-29 RX ADMIN — Medication 100 MILLIEQUIVALENT(S): at 06:18

## 2018-05-29 NOTE — PROVIDER CONTACT NOTE (OTHER) - ACTION/TREATMENT ORDERED:
MD aware, OK as per MD.  Will place order for antihypertensive medication. Will continue to monitor.
Hydralazine po will be given as ordered.

## 2018-05-29 NOTE — CHART NOTE - NSCHARTNOTEFT_GEN_A_CORE
Patient received from SICU. Doing well. Last Hct stable at 11.2 from 11.1 . Underwent colonoscopy today showed now evidence of active bleeding. Tolerating clears. Voiding and ambulating without issue. Blood pressure continues to be elevated, but patient denies HA, lightheadedness, SOB or CP.     Vital Signs Last 24 Hrs  T(C): 36.6 (29 May 2018 17:25), Max: 36.6 (29 May 2018 17:25)  T(F): 97.8 (29 May 2018 17:25), Max: 97.8 (29 May 2018 17:25)  HR: 90 (29 May 2018 17:25) (75 - 94)  BP: 157/116 (29 May 2018 17:25) (118/83 - 161/111)  BP(mean): 112 (29 May 2018 16:00) (90 - 123)  RR: 18 (29 May 2018 17:25) (15 - 20)  SpO2: 98% (29 May 2018 17:25) (93% - 100%)    I&O's Detail    28 May 2018 07:01  -  29 May 2018 07:00  --------------------------------------------------------  IN:    IV PiggyBack: 150 mL    lactated ringers.: 2750 mL    Oral Fluid: 1300 mL  Total IN: 4200 mL    OUT:    Stool: 2150 mL    Voided: 2000 mL  Total OUT: 4150 mL    Total NET: 50 mL      29 May 2018 07:01  -  29 May 2018 19:06  --------------------------------------------------------  IN:    dextrose 5% + sodium chloride 0.45% with potassium chloride 20 mEq/L: 150 mL    lactated ringers.: 250 mL    Oral Fluid: 250 mL  Total IN: 650 mL    OUT:    Voided: 1100 mL  Total OUT: 1100 mL    Total NET: -450 mL    GEN: A&Ox3, NAD  RESP: no increased work of breathing, no use of accessory muscles  GI/ABD: soft, nontender, nondistended. no rebound or guarding.   EXTREMITIES: warm, pink, well-perfused    Labs:                        11.2   6.18  )-----------( 182      ( 29 May 2018 14:47 )             31.8   Imaging:  < from: Colonoscopy (05.29.18 @ 10:29) >    Impression:          - Moderate diverticulosis in the entire examined colon.                        There was evidence of old blood throughout the left                        colon. No active bleeding was identified.                       - Old blood in the recto-sigmoid, sigmoid, descending                        and distal transverse colon.                       - The examination was otherwise normal. No masses or                        angioectasia were identified on exam.                       - Exam was inadequate to screen for polyps < 1 cm given                        presence of old blood throughout the colon.  Recommendation:      - Return patient to [facility] for ongoing care.                       - Check hemoglobin q 12 hours until stable.                       - Use sugar-free Metamucil one tablespoon PO daily.    A/P:    64 year old male with bright red blood per rectum, likely associated with repeat diverticular bleed; requiring no transfusion, mildly tachycardic () and hypertensive. Hypertension improved, most recently 130s/ 90s, with multiple bloody BMs yesterday, waiting colonoscopy today. Now downgraded from SICU, colonoscopy revealed only diverticulosis no active bleeding, last three Hct stable (11.3-->11.1 --> 11.2) , recovering without issue.    - CLD  - Trend Hct, next CBC tonight at 9 pm   - GI recs appreciated  - BP control, started labetolol 100 TID and hydralazine 10 PRN SBP>180  - Strict I's and O's

## 2018-05-29 NOTE — PROGRESS NOTE ADULT - ASSESSMENT
64 year old male with bright red blood per rectum, likely associated with repeat diverticular bleed; requiring no transfusion, mildly tachycardic () and hypertensive. Hypertension improved, most recently 130s/ 90s, with multiple bloody BMs yesterday, waiting colonoscopy today.     - NPO/IVF for colonoscopy  - Trend Hct  - GI recs appreciated  - BP control  - Strict I's and O's

## 2018-05-29 NOTE — PROGRESS NOTE ADULT - ATTENDING COMMENTS
Attending Attestation:   I have personally interviewed and examined this patient, reviewed pertinent labs and imaging, and discussed the case with colleagues, residents, physician assistants, and nurses on SICU rounds.     35    minutes in total were spent in providing direct critical care for the diagnoses, assessment and plan outlined below.  These diagnoses are unrelated to the surgical procedure.  Additionally, time spent in the performance of separately billable procedures was not counted toward the critical care time.  There is no overlap.    The active critical care issues are:  I10 Hypertension, unspecified type   K57.91 Gastrointestinal hemorrhage associated with intestinal diverticulosis     Plan  neuro: no active issue  cards: no active issue  pulm: no active issue  GI: NPO monitor for bleeding. CTA with left-sided active extravesation, colonoscopy today   : monitor uop, Cr  heme/ID: q6hr cbc until stable x 3, no abx  endo: no active issue  proph: scds only for now . Attending Attestation:   I have personally interviewed and examined this patient, reviewed pertinent labs and imaging, and discussed the case with colleagues, residents, physician assistants, and nurses on SICU rounds.     35    minutes in total were spent in providing direct critical care for the diagnoses, assessment and plan outlined below.  These diagnoses are unrelated to the surgical procedure.  Additionally, time spent in the performance of separately billable procedures was not counted toward the critical care time.  There is no overlap.    The active critical care issues are:  I10 Hypertension, unspecified type   K57.91 Gastrointestinal hemorrhage associated with intestinal diverticulosis     Plan  neuro: no active issue  cards: no active issue  pulm: no active issue  GI: NPO monitor for bleeding. CTA with left-sided active extravesation, colonoscopy today   : monitor uop, Cr  heme/ID: q6hr cbc until stable x 3, no abx  endo: on sliding scale insulin, monitor fsg  proph: scds only for now .

## 2018-05-30 VITALS — HEART RATE: 80 BPM | DIASTOLIC BLOOD PRESSURE: 97 MMHG | SYSTOLIC BLOOD PRESSURE: 135 MMHG

## 2018-05-30 LAB
BUN SERPL-MCNC: 5 MG/DL — LOW (ref 7–23)
CA-I BLD-SCNC: 1.06 MMOL/L — SIGNIFICANT CHANGE UP (ref 1.03–1.23)
CALCIUM SERPL-MCNC: 7.6 MG/DL — LOW (ref 8.4–10.5)
CHLORIDE SERPL-SCNC: 100 MMOL/L — SIGNIFICANT CHANGE UP (ref 98–107)
CO2 SERPL-SCNC: 25 MMOL/L — SIGNIFICANT CHANGE UP (ref 22–31)
CREAT SERPL-MCNC: 0.77 MG/DL — SIGNIFICANT CHANGE UP (ref 0.5–1.3)
GLUCOSE SERPL-MCNC: 185 MG/DL — HIGH (ref 70–99)
HCT VFR BLD CALC: 31.5 % — LOW (ref 39–50)
HGB BLD-MCNC: 10.6 G/DL — LOW (ref 13–17)
MAGNESIUM SERPL-MCNC: 1.9 MG/DL — SIGNIFICANT CHANGE UP (ref 1.6–2.6)
MCHC RBC-ENTMCNC: 29.4 PG — SIGNIFICANT CHANGE UP (ref 27–34)
MCHC RBC-ENTMCNC: 33.7 % — SIGNIFICANT CHANGE UP (ref 32–36)
MCV RBC AUTO: 87.5 FL — SIGNIFICANT CHANGE UP (ref 80–100)
NRBC # FLD: 0 — SIGNIFICANT CHANGE UP
PHOSPHATE SERPL-MCNC: 2.6 MG/DL — SIGNIFICANT CHANGE UP (ref 2.5–4.5)
PLATELET # BLD AUTO: 179 K/UL — SIGNIFICANT CHANGE UP (ref 150–400)
PMV BLD: 9.6 FL — SIGNIFICANT CHANGE UP (ref 7–13)
POTASSIUM SERPL-MCNC: 3.6 MMOL/L — SIGNIFICANT CHANGE UP (ref 3.5–5.3)
POTASSIUM SERPL-SCNC: 3.6 MMOL/L — SIGNIFICANT CHANGE UP (ref 3.5–5.3)
RBC # BLD: 3.6 M/UL — LOW (ref 4.2–5.8)
RBC # FLD: 12.6 % — SIGNIFICANT CHANGE UP (ref 10.3–14.5)
SODIUM SERPL-SCNC: 135 MMOL/L — SIGNIFICANT CHANGE UP (ref 135–145)
SPECIMEN SOURCE: SIGNIFICANT CHANGE UP
WBC # BLD: 6.92 K/UL — SIGNIFICANT CHANGE UP (ref 3.8–10.5)
WBC # FLD AUTO: 6.92 K/UL — SIGNIFICANT CHANGE UP (ref 3.8–10.5)

## 2018-05-30 PROCEDURE — 99221 1ST HOSP IP/OBS SF/LOW 40: CPT | Mod: GC

## 2018-05-30 PROCEDURE — 99232 SBSQ HOSP IP/OBS MODERATE 35: CPT | Mod: GC

## 2018-05-30 RX ORDER — POTASSIUM CHLORIDE 20 MEQ
20 PACKET (EA) ORAL ONCE
Qty: 0 | Refills: 0 | Status: COMPLETED | OUTPATIENT
Start: 2018-05-30 | End: 2018-05-30

## 2018-05-30 RX ORDER — SODIUM,POTASSIUM PHOSPHATES 278-250MG
1 POWDER IN PACKET (EA) ORAL ONCE
Qty: 0 | Refills: 0 | Status: COMPLETED | OUTPATIENT
Start: 2018-05-30 | End: 2018-05-30

## 2018-05-30 RX ORDER — HYDRALAZINE HCL 50 MG
5 TABLET ORAL EVERY 6 HOURS
Qty: 0 | Refills: 0 | Status: DISCONTINUED | OUTPATIENT
Start: 2018-05-30 | End: 2018-05-30

## 2018-05-30 RX ORDER — LISINOPRIL 2.5 MG/1
1 TABLET ORAL
Qty: 30 | Refills: 0 | OUTPATIENT
Start: 2018-05-30 | End: 2018-06-28

## 2018-05-30 RX ORDER — LABETALOL HCL 100 MG
1 TABLET ORAL
Qty: 42 | Refills: 0 | OUTPATIENT
Start: 2018-05-30 | End: 2018-06-12

## 2018-05-30 RX ORDER — PSYLLIUM SEED (WITH DEXTROSE)
1 POWDER (GRAM) ORAL
Qty: 0 | Refills: 0 | COMMUNITY
Start: 2018-05-30

## 2018-05-30 RX ORDER — LABETALOL HCL 100 MG
100 TABLET ORAL EVERY 8 HOURS
Qty: 0 | Refills: 0 | Status: DISCONTINUED | OUTPATIENT
Start: 2018-05-30 | End: 2018-05-30

## 2018-05-30 RX ORDER — LISINOPRIL 2.5 MG/1
1 TABLET ORAL
Qty: 30 | Refills: 3 | OUTPATIENT
Start: 2018-05-30 | End: 2018-09-26

## 2018-05-30 RX ORDER — AMLODIPINE BESYLATE 2.5 MG/1
10 TABLET ORAL DAILY
Qty: 0 | Refills: 0 | Status: DISCONTINUED | OUTPATIENT
Start: 2018-05-30 | End: 2018-05-30

## 2018-05-30 RX ORDER — AMLODIPINE BESYLATE 2.5 MG/1
1 TABLET ORAL
Qty: 14 | Refills: 0 | OUTPATIENT
Start: 2018-05-30 | End: 2018-06-12

## 2018-05-30 RX ADMIN — Medication 2: at 07:58

## 2018-05-30 RX ADMIN — Medication 20 MILLIEQUIVALENT(S): at 08:13

## 2018-05-30 RX ADMIN — Medication 2: at 12:29

## 2018-05-30 RX ADMIN — AMLODIPINE BESYLATE 10 MILLIGRAM(S): 2.5 TABLET ORAL at 06:30

## 2018-05-30 RX ADMIN — Medication 1 PACKET(S): at 10:12

## 2018-05-30 RX ADMIN — Medication 100 MILLIGRAM(S): at 02:20

## 2018-05-30 RX ADMIN — Medication 1 PACKET(S): at 12:10

## 2018-05-30 RX ADMIN — Medication 100 MILLIGRAM(S): at 10:11

## 2018-05-30 NOTE — DISCHARGE NOTE ADULT - CARE PLAN
Principal Discharge DX:	Bright red blood per rectum  Goal:	Medical management, Colonoscopy  Assessment and plan of treatment:	Please follow up with your primary care physician regarding your hospitalization.  Please follow up with a colorectal surgeon as an outpatient. You may make an appointment with Dr. Hubbard of [facility] if you would like by calling [phone].  Secondary Diagnosis:	Essential hypertension  Assessment and plan of treatment:	Please follow up with your primary care physician regarding your hypertension during this hospitalization.  The patient may resume a regular diet and activity. Take medications as prescribed.  Secondary Diagnosis:	Diverticulosis of large intestine with hemorrhage  Secondary Diagnosis:	Type 2 diabetes mellitus without complication, with long-term current use of insulin Principal Discharge DX:	Bright red blood per rectum  Goal:	Medical management, Colonoscopy  Assessment and plan of treatment:	Please follow up with your primary care physician regarding your hospitalization.  Please follow up with a colorectal surgeon as an outpatient. You may make an appointment with Dr. Hubbard of [facility] if you would like by calling [phone].  Secondary Diagnosis:	Essential hypertension  Assessment and plan of treatment:	Please follow up with your primary care physician regarding your hypertension during this hospitalization.  The patient may resume a regular diet and activity. Take medications as prescribed. DISCUSS NEW BLOOD PRESSURE MEDICATIONS  Secondary Diagnosis:	Diverticulosis of large intestine with hemorrhage  Secondary Diagnosis:	Type 2 diabetes mellitus without complication, with long-term current use of insulin

## 2018-05-30 NOTE — DISCHARGE NOTE ADULT - PATIENT PORTAL LINK FT
You can access the [facility] Patient Portal, offered by [facility], by registering with the following website: [email or web address]

## 2018-05-30 NOTE — DISCHARGE NOTE ADULT - CARE PROVIDERS DIRECT ADDRESSES
[email or web address]

## 2018-05-30 NOTE — DISCHARGE NOTE ADULT - SECONDARY DIAGNOSIS.
Essential hypertension Diverticulosis of large intestine with hemorrhage Type 2 diabetes mellitus without complication, with long-term current use of insulin

## 2018-05-30 NOTE — PROGRESS NOTE ADULT - ATTENDING COMMENTS
I have seen and evaluated the patient with the GI Fellow and GI Team.  I agree with the findings, formulation and plan of care as documented in the resident's / fellow's note, except as noted.

## 2018-05-30 NOTE — PROGRESS NOTE ADULT - SUBJECTIVE AND OBJECTIVE BOX
SICU Progress Note  =====================================================  Interval Events/Overnight Events: During the day patient had several blood bowel movements, but H/H remained stable. Patient was prepped for Colonoscopy to be done 5/29/18.      HISTORY  HPI: The patient is a 62 year old male h/o GI bleed, diverticulosis with subsequent colonoscopy and clipping in 2013 who currently presents with two episodes of asymptomatic bright red blood per rectum. Stable H/H 15/45 x2. Patient states that bloody bowel movements began at midnight [currently 08:30] and have had only two since; stool mixed with blood and clots. Feels similar to previous episode, which was treated with the aforementioned colonoscopy and clipping as well as TXA. Has not had any further bleeding issues or colonoscopies since. Was in his usual good state of health until midnight, eating and drinking as per his norm. Patient currently denies chest pain, shortness of breath, dizziness, weakness or syncope. Patient denies h/o HTN or anti-hypertensive medications.     Patient is on vacation from the [facility], and has a scheduled flight home this Wednesday, which he would like to make.      Medical and Surgical History:   - no surgical history  - HTN  - DM II  - Diverticulosis  - GI bleed (R sided diverticular bleed, 2013)     Medications:  Neurologic Medications    Respiratory Medications    Cardiovascular Medications    Gastrointestinal Medications  bisacodyl 10 milliGRAM(s) Oral every 12 hours  lactated ringers. 1000 milliLiter(s) IV Continuous <Continuous>    Genitourinary Medications    Hematologic/Oncologic Medications    Antimicrobial/Immunologic Medications    Endocrine/Metabolic Medications  insulin lispro (HumaLOG) corrective regimen sliding scale   SubCutaneous every 6 hours    Topical/Other Medications      Allergies: NKDA     Social Hx: Former smoker (60 pack year history, quit 1995), denies EtOH use, lives in the [facility], works as an , visiting family        ADVANCE DIRECTIVES:  Full Code       SUBJECTIVE/ROS:  [x] A ten-point review of systems was otherwise negative except as noted.  [ ] Due to altered mental status/intubation, subjective information were not able to be obtained from the patient. History was obtained, to the extent possible, from review of the chart and collateral sources of information.      VITAL SIGNS, INS/OUTS (last 24 hours):  --------------------------------------------------------------------------------------  T(C): 36.1 (05-29-18 @ 00:00), Max: 37.2 (05-28-18 @ 02:42)  HR: 83 (05-29-18 @ 02:00) (78 - 110)  BP: 118/83 (05-29-18 @ 02:00) (118/83 - 213/137)  BP(mean): 90 (05-29-18 @ 02:00) (90 - 136)  ABP: --  ABP(mean): --  RR: 17 (05-29-18 @ 02:00) (15 - 23)  SpO2: 96% (05-29-18 @ 02:00) (93% - 100%)  Wt(kg): --  CVP(mm Hg): --  CI: --  CAPILLARY BLOOD GLUCOSE      POCT Blood Glucose.: 133 mg/dL (29 May 2018 00:13)  POCT Blood Glucose.: 156 mg/dL (28 May 2018 17:46)  POCT Blood Glucose.: 159 mg/dL (28 May 2018 11:59)  POCT Blood Glucose.: 149 mg/dL (28 May 2018 08:09)  POCT Blood Glucose.: 131 mg/dL (28 May 2018 06:56)   N/A      05-28 @ 07:01  -  05-29 @ 02:09  --------------------------------------------------------  IN:    lactated ringers.: 2250 mL    Oral Fluid: 1300 mL  Total IN: 3550 mL    OUT:    Stool: 1950 mL    Voided: 2000 mL  Total OUT: 3950 mL    Total NET: -400 mL        --------------------------------------------------------------------------------------    EXAM  NEUROLOGY  Exam: AAO x4, no focal deficits    HEENT  Exam: Normocephalic, atraumatic    RESPIRATORY  Exam: non-labored breathing on room air    CARDIOVASCULAR  Exam: Regular rhythm, rate in the 90s    GI/NUTRITION  Exam: softly distended, non-tender    VASCULAR  Exam: Extremities warm, pink, well-perfused.      MUSCULOSKELETAL  Exam: All extremities moving spontaneously without limitations.       SKIN:  Exam: Good skin turgor, no skin breakdown.       HEMATOLOGIC  [x] DVT Prophylaxis:   Transfusions:	[] PRBC	[] Platelets		[] FFP	[] Cryoprecipitate      Tubes/Lines/Drains   [x] Peripheral IV  [] Central Venous Line     	[] R	[] L	[] IJ	[] Fem	[] SC	Date Placed:   [] Arterial Line		[] R	[] L	[] Fem	[] Rad	[] Ax	Date Placed:   [] PICC:         	[] Midline		[] Mediport  [] Urinary Catheter		Date Placed:     LABS  --------------------------------------------------------------------------------------   CBC (05-28 @ 18:40)                              13.9                           9.10    )----------------(  196        --    % Neutrophils, --    % Lymphocytes, ANC: --                                  39.8                CBC (05-28 @ 12:35)                              13.5                           6.71    )----------------(  185        --    % Neutrophils, --    % Lymphocytes, ANC: --                                  39.2                  BMP (05-28 @ 03:00)             132<L>  |  93<L>   |  18    		Ca++ --      Ca 8.8                ---------------------------------( 152<H>		Mg --                 3.9     |  23      |  0.71  			Ph --        LFTs (05-28 @ 03:00)      TPro 7.5 / Alb 4.2 / TBili 0.5 / DBili -- / AST 24 / ALT 29 / AlkPhos 55
B Team (General Surgery) Daily Progress Note    SUBJECTIVE:  Pt seen and examined this AM. Denied any further bloody BM, but H/H down from admiion but stable ( 13 on admission --> 11.3 --> 11.1-->10.9 ) . Tolerated clears.     OBJECTIVE:  Vital Signs Last 24 Hrs  T(C): 36.7 (30 May 2018 01:37), Max: 36.7 (29 May 2018 21:07)  T(F): 98.1 (30 May 2018 01:37), Max: 98.1 (29 May 2018 21:07)  HR: 106 (30 May 2018 01:37) (75 - 106)  BP: 148/104 (30 May 2018 01:37) (126/83 - 189/120)  BP(mean): 112 (29 May 2018 16:00) (95 - 116)  RR: 18 (30 May 2018 01:37) (15 - 20)  SpO2: 97% (30 May 2018 01:37) (94% - 100%)    I&O's Detail    28 May 2018 07:01  -  29 May 2018 07:00  --------------------------------------------------------  IN:    IV PiggyBack: 150 mL    lactated ringers.: 2750 mL    Oral Fluid: 1300 mL  Total IN: 4200 mL    OUT:    Stool: 2150 mL    Voided: 2000 mL  Total OUT: 4150 mL    Total NET: 50 mL      29 May 2018 07:01  -  30 May 2018 02:03  --------------------------------------------------------  IN:    dextrose 5% + sodium chloride 0.45% with potassium chloride 20 mEq/L: 375 mL    lactated ringers.: 250 mL    Oral Fluid: 250 mL  Total IN: 875 mL    OUT:    Voided: 2500 mL  Total OUT: 2500 mL    Total NET: -1625 mL    Exam:  GEN: A&Ox3, NAD  RESP: no increased work of breathing, no use of accessory muscles  GI/ABD: soft, nontender, nondistended. no rebound or guarding.   EXTREMITIES: warm, pink, well-perfused                                      10.9   6.89  )-----------( 167      ( 29 May 2018 21:22 )             31.0
B Team (General Surgery) Daily Progress Note    SUBJECTIVE:  Pt seen and examined this AM. had several blood bowel movements, but H/H remained stable ( 13 on admission --> 11.3 --> 11.1 ) . Overnight had bowel prep for colonoscopy today.     OBJECTIVE:  Vital Signs Last 24 Hrs  T(C): 36.2 (29 May 2018 08:00), Max: 36.3 (28 May 2018 16:00)  T(F): 97.2 (29 May 2018 08:00), Max: 97.4 (28 May 2018 16:00)  HR: 81 (29 May 2018 09:00) (75 - 104)  BP: 139/97 (29 May 2018 08:00) (118/83 - 176/117)  BP(mean): 106 (29 May 2018 08:00) (90 - 136)  RR: 18 (29 May 2018 09:00) (15 - 23)  SpO2: 97% (29 May 2018 09:00) (93% - 99%)    I&O's Detail    28 May 2018 07:01  -  29 May 2018 07:00  --------------------------------------------------------  IN:    IV PiggyBack: 150 mL    lactated ringers.: 2750 mL    Oral Fluid: 1300 mL  Total IN: 4200 mL    OUT:    Stool: 2150 mL    Voided: 2000 mL  Total OUT: 4150 mL    Total NET: 50 mL      29 May 2018 07:01  -  29 May 2018 10:49  --------------------------------------------------------  IN:    lactated ringers.: 125 mL  Total IN: 125 mL    OUT:  Total OUT: 0 mL    Total NET: 125 mL        Exam:  GEN: A&Ox3, NAD  RESP: no increased work of breathing, no use of accessory muscles  GI/ABD: soft, nontender, nondistended. no rebound or guarding.   EXTREMITIES: warm, pink, well-perfused                        11.1   6.61  )-----------( 158      ( 29 May 2018 08:31 )             32.4       05-29    138  |  102  |  11  ----------------------------<  134<H>  3.4<L>   |  25  |  0.82    Ca    7.7<L>      29 May 2018 03:00  Phos  3.2     05-29  Mg     1.8     05-29    TPro  7.5  /  Alb  4.2  /  TBili  0.5  /  DBili  x   /  AST  24  /  ALT  29  /  AlkPhos  55  05-28      PT/INR - ( 29 May 2018 03:00 )   PT: 12.1 SEC;   INR: 1.09          PTT - ( 29 May 2018 03:00 )  PTT:33.9 SEC
Chief Complaint:  Patient is a 62y old  Male who presents with a chief complaint of rectal bleeding (30 May 2018 11:19)      Interval Events:   - patient had colonoscopy yesterday showing diverticulosis and old blood throughout the right side of the colon  - he feels well this AM  - he had another melenic BM overnight but denies any further hematochezia    Allergies:  No Known Allergies      Hospital Medications:  amLODIPine   Tablet 10 milliGRAM(s) Oral daily  chlorhexidine 4% Liquid 1 Application(s) Topical <User Schedule>  glucagon  Injectable 1 milliGRAM(s) IntraMuscular once PRN  insulin lispro (HumaLOG) corrective regimen sliding scale   SubCutaneous Before meals and at bedtime  labetalol 100 milliGRAM(s) Oral every 8 hours  psyllium Powder 1 Packet(s) Oral daily      PMHX/PSHX:  Diabetes mellitus  HTN (hypertension)  Diverticulosis  No significant past surgical history      Family history:  Family history of breast cancer in mother (Mother)  No pertinent family history in first degree relatives      ROS:     General:  No wt loss, fevers, chills, night sweats, fatigue   Eyes:  Good vision, no reported pain  ENT:  No sore throat, pain, runny nose  CV:  No chest pain, palpitations  Resp:  No cough, wheezing, SOB  GI:  See HPI  :  No pain, bleeding, incontinence, nocturia  Muscle:  No pain, weakness  Neuro:  No weakness, tingling, memory problems  Psych:  No fatigue, insomnia, mood problems, depression  Endocrine:  No cold/heat intolerance  Heme:  No petechiae, ecchymosis, easy bruising  Skin:  No rash, edema      PHYSICAL EXAM:   Vital Signs:  Vital Signs Last 24 Hrs  T(C): 36.4 (30 May 2018 10:04), Max: 36.8 (30 May 2018 06:00)  T(F): 97.5 (30 May 2018 10:04), Max: 98.2 (30 May 2018 06:00)  HR: 80 (30 May 2018 12:00) (77 - 106)  BP: 135/97 (30 May 2018 12:00) (122/92 - 189/120)  BP(mean): 112 (29 May 2018 16:00) (105 - 116)  RR: 17 (30 May 2018 10:04) (15 - 19)  SpO2: 97% (30 May 2018 10:04) (97% - 100%)  Daily     Daily Weight in k.6 (30 May 2018 01:37)    GENERAL:  NAD  HEENT:  sclera anicteric  CHEST:  no respiratory distress, CTAB  HEART:  RRR, no MRG, no edema  ABDOMEN:  Soft, non-tender, non-distended, no masses , no hepato-splenomegaly,   EXTREMITIES:  no cyanosis, no edema  SKIN:  No rash  NEURO:  Alert, oriented      LABS:                        10.6   6.92  )-----------( 179      ( 30 May 2018 06:00 )             31.5     Hgb 11.2 -> 10.9 -> 10.6      05-30    135  |  100  |  5<L>  ----------------------------<  185<H>  3.6   |  25  |  0.77    Ca    7.6<L>      30 May 2018 06:00  Phos  2.6     05-30  Mg     1.9     05-30        PT/INR - ( 29 May 2018 03:00 )   PT: 12.1 SEC;   INR: 1.09          PTT - ( 29 May 2018 03:00 )  PTT:33.9 SEC      Imaging:

## 2018-05-30 NOTE — DISCHARGE NOTE ADULT - HOSPITAL COURSE
64 year old male with bright red blood per rectum, likely associated with repeat diverticular bleed; requiring no transfusion, mildly tachycardic () and hypertensive. Colonoscopy showed diverticulosis, no active bleeding, and evidence of old blood. His serial CBCs remained stable. At the time of discharge, the patient was hemodynamically stable, was tolerating PO diet, was voiding urine and passing stool, was ambulating, and was comfortable with adequate pain control.

## 2018-05-30 NOTE — CONSULT NOTE ADULT - SUBJECTIVE AND OBJECTIVE BOX
62M with h/o DMII, HTN, diverticulosis complicated by right sided diverticular bleed () who initially presented on 18 with BRBPR, suspicious for recurrent diverticular bleed. Admission complicated by elevated BP (SBP 200s-190s). Pt with known history of hypertension, although uncertain what antihypertensives he takes at home.  Pt admitted to the SICU for serial CBC and BP control.  Had a CT angio A/P on  remarkable for active bleeding in the rectum and descending colon but no evidence of active diverticulitis. Colonoscopy was performed on  that revealed moderate diverticulosis in the entire colon, evdience of old blood throughout the L colon, recto-sigmoid, sigmoid, descending and transverse colon but no active bleeding identified; the exam was otherwise normal and no masses or angioectasia was idenitifed. On , pt was transferred out of the SICU to the general surgery [facility] as H/H remained stable. BP initially managed with IV pushes of hydralazine PRN and patient has now been transitioned to a standing regimen per primary team of Norvasc 10mg PO qd and labetolol 100mg PO TID along with PRN pushes of hydralazine 5mg IV for SBP>160. Pt did require a one-time push of hydralazine 10mg IV x 1 @ 9PM for BP read of 189/120, but no additional PRNs needed. Pt tolerating this regimen with improvement in BP. Medicine consulted for additional recommendations regarding antihypertensive regimen  prior to discharge.  At this time, pt denies any headaches, vision changes, lightheadedness, CP/ palpitations, SOB or any other systemic symptoms. Pt is a former smoker (60 pack year history, quit 23 years ago). Denies any EtOH or illicit drug use.     MEDICATIONS  (STANDING):  amLODIPine   Tablet 10 milliGRAM(s) Oral daily  chlorhexidine 4% Liquid 1 Application(s) Topical <User Schedule>  dextrose 5%. 1000 milliLiter(s) (50 mL/Hr) IV Continuous <Continuous>  dextrose 50% Injectable 12.5 Gram(s) IV Push once  dextrose 50% Injectable 25 Gram(s) IV Push once  dextrose 50% Injectable 25 Gram(s) IV Push once  insulin lispro (HumaLOG) corrective regimen sliding scale   SubCutaneous Before meals and at bedtime  labetalol 100 milliGRAM(s) Oral every 8 hours  psyllium Powder 1 Packet(s) Oral daily    MEDICATIONS  (PRN):  dextrose 40% Gel 15 Gram(s) Oral once PRN Blood Glucose LESS THAN 70 milliGRAM(s)/deciLiter  glucagon  Injectable 1 milliGRAM(s) IntraMuscular once PRN Glucose <70 milliGRAM(s)/deciLiter  hydrALAZINE Injectable 5 milliGRAM(s) IV Push every 6 hours PRN SBP>160    ROS: Negative as per HPI.     ICU Vital Signs Last 24 Hrs  T(C): 36.4 (30 May 2018 10:04), Max: 36.8 (30 May 2018 06:00)  T(F): 97.5 (30 May 2018 10:04), Max: 98.2 (30 May 2018 06:00)  HR: 106 (30 May 2018 10:04) (77 - 106)  BP: 122/92 (30 May 2018 10:04) (122/92 - 189/120)  BP(mean): 112 (29 May 2018 16:00) (105 - 116)  ABP: --  ABP(mean): --  RR: 17 (30 May 2018 10:04) (15 - 19)  SpO2: 97% (30 May 2018 10:04) (97% - 100%)    LABS:                         10.6   6.92  )-----------( 179      ( 30 May 2018 06:00 )             31.5     30 May 2018 06:00    135    |  100    |  5      ----------------------------<  185    3.6     |  25     |  0.77     Ca    7.6        30 May 2018 06:00  Phos  2.6       30 May 2018 06:00  Mg     1.9       30 May 2018 06:00      PT/INR - ( 29 May 2018 03:00 )   PT: 12.1 SEC;   INR: 1.09     PTT - ( 29 May 2018 03:00 )  PTT:33.9 SEC    IMAGIN/28/18 CT Angio Abdomen/ Pelvis:   FINDINGS:  LOWER CHEST: 4 mm nodule in the right lower lobe.  LIVER: 4 mm low-attenuation lesion in segment 6 is probably a cyst.  BILE DUCTS: Normal caliber.  GALLBLADDER: Within normal limits.  SPLEEN: Within normal limits.  PANCREAS: Within normal limits.  ADRENALS: Within normal limits.  KIDNEYS/URETERS: Within normal limits.  BLADDER: Within normal limits.  REPRODUCTIVE ORGANS: The prostate is within normal limits.  BOWEL: Arterial contrast blush is noted within the descending colon   (6:241) and in the left lateral wall of the rectum with diffusion on   delayed phases. Colonic diverticulosis without acute diverticulitis. No   bowel obstruction. Appendix is normal.  PERITONEUM: No ascites.  VESSELS:  Atherosclerotic changes.  RETROPERITONEUM: No lymphadenopathy.    ABDOMINAL WALL: Small fat-containing umbilical hernia.  BONES: Degenerative changes of the spine.  IMPRESSION:   Active GI bleed in the rectum and the descending colon. No evidence of   acute diverticulitis.    18 Colonoscopy:   Impression:          - Moderate diverticulosis in the entire examined colon.                        There was evidence of old blood throughout the left                        colon. No active bleeding was identified.                       - Old blood in the recto-sigmoid, sigmoid, descending                        and distal transverse colon.                       - The examination was otherwise normal. No masses or                        angioectasia were identified on exam.                       - Exam was inadequate to screen for polyps < 1 cm given                        presence of old blood throughout the colon. 62M with h/o DMII, HTN, diverticulosis complicated by right sided diverticular bleed () who initially presented on 18 with BRBPR, suspicious for recurrent diverticular bleed. Admission complicated by elevated BP (SBP 200s-190s). Pt with known history of hypertension, although uncertain what antihypertensives he takes at home.  Pt admitted to the SICU for serial CBC and BP control.  Had a CT angio A/P on  remarkable for active bleeding in the rectum and descending colon but no evidence of active diverticulitis. Colonoscopy was performed on  that revealed moderate diverticulosis in the entire colon, evdience of old blood throughout the L colon, recto-sigmoid, sigmoid, descending and transverse colon but no active bleeding identified; the exam was otherwise normal and no masses or angioectasia was idenitifed. On , pt was transferred out of the SICU to the general surgery [facility] as H/H remained stable. BP initially managed with IV pushes of hydralazine PRN and patient has now been transitioned to a standing regimen per primary team of Norvasc 10mg PO qd and labetolol 100mg PO TID along with PRN pushes of hydralazine 5mg IV for SBP>160. Pt did require a one-time push of hydralazine 10mg IV x 1 @ 9PM for BP read of 189/120, but no additional PRNs needed. Pt tolerating this regimen with improvement in BP. Medicine consulted for additional recommendations regarding antihypertensive regimen  prior to discharge.  At this time, pt denies any headaches, vision changes, lightheadedness, CP/ palpitations, SOB or any other systemic symptoms. Pt is a former smoker (60 pack year history, quit 23 years ago). Denies any EtOH or illicit drug use.     MEDICATIONS  (STANDING):  amLODIPine   Tablet 10 milliGRAM(s) Oral daily  chlorhexidine 4% Liquid 1 Application(s) Topical <User Schedule>  dextrose 5%. 1000 milliLiter(s) (50 mL/Hr) IV Continuous <Continuous>  dextrose 50% Injectable 12.5 Gram(s) IV Push once  dextrose 50% Injectable 25 Gram(s) IV Push once  dextrose 50% Injectable 25 Gram(s) IV Push once  insulin lispro (HumaLOG) corrective regimen sliding scale   SubCutaneous Before meals and at bedtime  labetalol 100 milliGRAM(s) Oral every 8 hours  psyllium Powder 1 Packet(s) Oral daily    MEDICATIONS  (PRN):  dextrose 40% Gel 15 Gram(s) Oral once PRN Blood Glucose LESS THAN 70 milliGRAM(s)/deciLiter  glucagon  Injectable 1 milliGRAM(s) IntraMuscular once PRN Glucose <70 milliGRAM(s)/deciLiter  hydrALAZINE Injectable 5 milliGRAM(s) IV Push every 6 hours PRN SBP>160    ROS: Negative as per HPI.     ICU Vital Signs Last 24 Hrs  T(C): 36.4 (30 May 2018 10:04), Max: 36.8 (30 May 2018 06:00)  T(F): 97.5 (30 May 2018 10:04), Max: 98.2 (30 May 2018 06:00)  HR: 106 (30 May 2018 10:04) (77 - 106)  BP: 122/92 (30 May 2018 10:04) (122/92 - 189/120)  BP(mean): 112 (29 May 2018 16:00) (105 - 116)  ABP: --  ABP(mean): --  RR: 17 (30 May 2018 10:04) (15 - 19)  SpO2: 97% (30 May 2018 10:04) (97% - 100%)    PHYSICAL EXAM:   GENERAL: NAD, comfortable appearing   HEAD:  Atraumatic, Normocephalic  ENT: MMM, conjunctiva clear   CHEST/LUNG: CTAB ; no crackles   HEART: Regular rate and rhythm; No murmurs, rubs, or gallops  ABDOMEN: Soft, Nontender, Nondistended; normoactive BS  EXTREMITIES:  No clubbing, cyanosis, or edema  PSYCH: Nl behavior, nl affect  NEUROLOGY: AAOx3, non-focal, cranial nerves intact  SKIN: Normal color, No rashes or lesions      LABS:                         10.6   6.92  )-----------( 179      ( 30 May 2018 06:00 )             31.5     30 May 2018 06:00    135    |  100    |  5      ----------------------------<  185    3.6     |  25     |  0.77     Ca    7.6        30 May 2018 06:00  Phos  2.6       30 May 2018 06:00  Mg     1.9       30 May 2018 06:00      PT/INR - ( 29 May 2018 03:00 )   PT: 12.1 SEC;   INR: 1.09     PTT - ( 29 May 2018 03:00 )  PTT:33.9 SEC    IMAGIN/28/18 CT Angio Abdomen/ Pelvis:   FINDINGS:  LOWER CHEST: 4 mm nodule in the right lower lobe.  LIVER: 4 mm low-attenuation lesion in segment 6 is probably a cyst.  BILE DUCTS: Normal caliber.  GALLBLADDER: Within normal limits.  SPLEEN: Within normal limits.  PANCREAS: Within normal limits.  ADRENALS: Within normal limits.  KIDNEYS/URETERS: Within normal limits.  BLADDER: Within normal limits.  REPRODUCTIVE ORGANS: The prostate is within normal limits.  BOWEL: Arterial contrast blush is noted within the descending colon   (6:241) and in the left lateral wall of the rectum with diffusion on   delayed phases. Colonic diverticulosis without acute diverticulitis. No   bowel obstruction. Appendix is normal.  PERITONEUM: No ascites.  VESSELS:  Atherosclerotic changes.  RETROPERITONEUM: No lymphadenopathy.    ABDOMINAL WALL: Small fat-containing umbilical hernia.  BONES: Degenerative changes of the spine.  IMPRESSION:   Active GI bleed in the rectum and the descending colon. No evidence of   acute diverticulitis.    18 Colonoscopy:   Impression:          - Moderate diverticulosis in the entire examined colon.                        There was evidence of old blood throughout the left                        colon. No active bleeding was identified.                       - Old blood in the recto-sigmoid, sigmoid, descending                        and distal transverse colon.                       - The examination was otherwise normal. No masses or                        angioectasia were identified on exam.                       - Exam was inadequate to screen for polyps < 1 cm given                        presence of old blood throughout the colon.

## 2018-05-30 NOTE — CONSULT NOTE ADULT - ASSESSMENT
62M with h/o DMII, HTN, diverticulosis complicated by right sided diverticular bleed (2013) presenting with painless hematochezia 2/2 colonic diverticular bleed as seen on CTA and colonoscopy with admission complicated by poorly complicated hypertension, although now improved on standing antihypertensive regimen.     #Stage II HTN - suspected component of non-compliance with outpatient medications contributing as patient now stable on Norvasc and labetalol. No signs of end-organ compromise at present, although this would be a concern should BP remains poorly controlled.   - would continue with Norvasc 10mg PO qd and labetolol 100mg PO TID   - consider addition of ACE-I (lisinopril 2.5 mg PO qd) given history of DMII and beneficial renal-protective effects.   -  patient in lifestyle modifications (DASH diet, moderate-intensity exercise) in conjunction with pharmacologic therapy  - strongly encourage patient to follow-up with PCP within a week following D/C for BP monitoring; may require uptitration if SBP > 160.     Annette Ace, PGY3  MAR 8AM- 8PM  [id number] | [phone] 62M with h/o DMII, HTN, diverticulosis complicated by right sided diverticular bleed (2013) presenting with painless hematochezia 2/2 colonic diverticular bleed as seen on CTA and colonoscopy with admission complicated by poorly complicated hypertension, although now improved on standing antihypertensive regimen.     #Stage II HTN - suspected component of non-compliance with outpatient medications contributing as patient now stable on appropriate, 2 drug regimen consisting of Norvasc and labetalol. No signs of end-organ compromise at present, although this would be a concern should BP remains poorly controlled.   - would continue with Norvasc 10mg PO qd and labetolol 100mg PO TID   - consider addition of ACE-I (lisinopril 2.5 mg PO qd) given history of DMII and beneficial renal-protective effects.   -  patient in lifestyle modifications (DASH diet, moderate-intensity exercise) in conjunction with pharmacologic therapy  - strongly encourage patient to follow-up with PCP within a week following D/C for BP monitoring; may require uptitration if SBP persistently> 160.     Annette Ace, PGY3  MAR 8AM- 8PM  [id number] | [phone]

## 2018-05-30 NOTE — PROGRESS NOTE ADULT - ASSESSMENT
63 yo man admitted with hematochezia due to diverticular hemorrhage.    1) Diverticular hemorrhage  2) Acute blood loss anemia    Plan:  - check hgb with daily CBC  - regular diet  - consider iron supplementation for 1-2 months post discharge

## 2018-05-30 NOTE — DISCHARGE NOTE ADULT - MEDICATION SUMMARY - MEDICATIONS TO TAKE
I will START or STAY ON the medications listed below when I get home from the hospital:    Lantus  -- Indication: For Diabetes mellitus    metFORMIN 1000 mg oral tablet  -- 1 tab(s) by mouth 2 times a day  -- Indication: For Diabetes mellitus    Lipitor 20 mg oral tablet  -- 1 tab(s) by mouth once a day  -- Indication: For Hld    labetalol 100 mg oral tablet  -- 1 tab(s) by mouth every 8 hours  -- Indication: For HTN (hypertension)    amLODIPine 10 mg oral tablet  -- 1 tab(s) by mouth once a day  -- Indication: For HTN (hypertension)    psyllium 3.4 g/7 g oral powder for reconstitution  -- 1 packet(s) by mouth once a day for 30 days  -- Indication: For constipation I will START or STAY ON the medications listed below when I get home from the hospital:    lisinopril 2.5 mg oral tablet  -- 1 tab(s) by mouth once a day   -- Do not take this drug if you are pregnant.  It is very important that you take or use this exactly as directed.  Do not skip doses or discontinue unless directed by your doctor.  Some non-prescription drugs may aggravate your condition.  Read all labels carefully.  If a warning appears, check with your doctor before taking.    -- Indication: For HTN (hypertension)    metFORMIN 1000 mg oral tablet  -- 1 tab(s) by mouth 2 times a day  -- Indication: For Diabetes mellitus    Lantus  -- Indication: For Diabetes mellitus    Lipitor 20 mg oral tablet  -- 1 tab(s) by mouth once a day  -- Indication: For Hld    labetalol 100 mg oral tablet  -- 1 tab(s) by mouth every 8 hours  -- Indication: For HTN (hypertension)    amLODIPine 10 mg oral tablet  -- 1 tab(s) by mouth once a day  -- Indication: For HTN (hypertension)    psyllium 3.4 g/7 g oral powder for reconstitution  -- 1 packet(s) by mouth once a day for 30 days  -- Indication: For constipation

## 2018-05-30 NOTE — PROGRESS NOTE ADULT - ASSESSMENT
A/P:    64 year old male with bright red blood per rectum, likely associated with repeat diverticular bleed; requiring no transfusion, mildly tachycardic () and hypertensive. Hypertension improved, most recently 130s/ 90s, with multiple bloody BMs yesterday, waiting colonoscopy today. Now downgraded from SICU, colonoscopy revealed only diverticulosis no active bleeding, last three Hct stable (11.1 --> 11.2-->10.9) , blood pressure remains elevated despite initiation of oral antihypertensives.     - CLD, adv to regular today   - Trend Hct, f/u AM labs   - GI recs appreciated  - BP control, c/w  labetolol 100 TID and hydralazine 10 PRN SBP>180  - Strict I's and O's.

## 2018-05-30 NOTE — DISCHARGE NOTE ADULT - PLAN OF CARE
Medical management, Colonoscopy Please follow up with your primary care physician regarding your hospitalization.  Please follow up with a colorectal surgeon as an outpatient. You may make an appointment with Dr. Hubbard of [facility] if you would like by calling [phone]. Please follow up with your primary care physician regarding your hypertension during this hospitalization.  The patient may resume a regular diet and activity. Take medications as prescribed. Please follow up with your primary care physician regarding your hypertension during this hospitalization.  The patient may resume a regular diet and activity. Take medications as prescribed. DISCUSS NEW BLOOD PRESSURE MEDICATIONS

## 2018-05-30 NOTE — DISCHARGE NOTE ADULT - CARE PROVIDER_API CALL
Austen Hubbard), ColonRectal Surgery; Surgery  [facility]  [address] [place] [address]  Phone: [phone]  Fax: [phone]    Dipesh Harris), Surgery; Surgical Critical Care  [address] [place] [address]  Phone: [phone]  Fax: [phone]

## 2018-05-31 LAB — BACTERIA STL CULT: SIGNIFICANT CHANGE UP

## 2023-08-20 NOTE — H&P ADULT - NSHPPHYSICALEXAM_GEN_ALL_CORE
Vital Signs Last 24 Hrs  T(C): 36.8 (28 May 2018 06:17), Max: 37.2 (28 May 2018 02:42)  T(F): 98.3 (28 May 2018 06:17), Max: 99 (28 May 2018 02:42)  HR: 102 (28 May 2018 06:57) (98 - 111)  BP: 176/129 (28 May 2018 06:57) (174/111 - 213/137)  RR: 20 (28 May 2018 06:57) (16 - 20)  SpO2: 100% (28 May 2018 06:57) (97% - 100%)    General: well developed, well nourished, NAD  Neuro: alert and oriented, no focal deficits, moves all extremities spontaneously  HEENT: NCAT, anicteric, mucosa moist  Respiratory: airway patent, respirations unlabored  CVS: regular rate and rhythm  Abdomen: soft, nontender, nondistended  Extremities: no edema, sensation and movement grossly intact  Skin: warm, dry, appropriate color Him/He

## 2024-08-04 NOTE — PATIENT PROFILE ADULT. - URINARY CATHETER
[TextEntry] : CARDIOVASCULAR: Negative RESPIRATORY: Negative GASTROINTESTINAL: Negative NEUROLOGICAL: Negative no